# Patient Record
Sex: FEMALE | Race: WHITE | URBAN - METROPOLITAN AREA
[De-identification: names, ages, dates, MRNs, and addresses within clinical notes are randomized per-mention and may not be internally consistent; named-entity substitution may affect disease eponyms.]

---

## 2017-01-06 ENCOUNTER — HISTORICAL (OUTPATIENT)
Dept: RADIOLOGY | Facility: HOSPITAL | Age: 77
End: 2017-01-06

## 2017-03-02 ENCOUNTER — HISTORICAL (OUTPATIENT)
Dept: LAB | Facility: HOSPITAL | Age: 77
End: 2017-03-02

## 2017-03-03 ENCOUNTER — HISTORICAL (OUTPATIENT)
Dept: RADIOLOGY | Facility: HOSPITAL | Age: 77
End: 2017-03-03

## 2018-01-16 ENCOUNTER — HISTORICAL (OUTPATIENT)
Dept: LAB | Facility: HOSPITAL | Age: 78
End: 2018-01-16

## 2018-01-16 LAB — DEPRECATED CALCIDIOL+CALCIFEROL SERPL-MC: 87.34 NG/ML (ref 30–80)

## 2018-12-04 ENCOUNTER — HISTORICAL (OUTPATIENT)
Dept: ADMINISTRATIVE | Facility: HOSPITAL | Age: 78
End: 2018-12-04

## 2019-01-29 ENCOUNTER — HISTORICAL (OUTPATIENT)
Dept: ADMINISTRATIVE | Facility: HOSPITAL | Age: 79
End: 2019-01-29

## 2019-11-05 ENCOUNTER — HISTORICAL (OUTPATIENT)
Dept: LAB | Facility: HOSPITAL | Age: 79
End: 2019-11-05

## 2019-11-05 LAB
APPEARANCE, UA: CLEAR
BACTERIA SPEC CULT: NORMAL /HPF
BILIRUB UR QL STRIP: NEGATIVE
COLOR UR: YELLOW
GLUCOSE (UA): NEGATIVE
HGB UR QL STRIP: NEGATIVE
KETONES UR QL STRIP: NEGATIVE
LEUKOCYTE ESTERASE UR QL STRIP: NEGATIVE
NITRITE UR QL STRIP: NEGATIVE
PH UR STRIP: 5.5 [PH] (ref 5–9)
PROT UR QL STRIP: NEGATIVE
RBC #/AREA URNS HPF: NORMAL /[HPF]
SP GR UR STRIP: 1.01 (ref 1–1.03)
SQUAMOUS EPITHELIAL, UA: NORMAL
UROBILINOGEN UR STRIP-ACNC: 0.2
WBC #/AREA URNS HPF: NORMAL /[HPF]

## 2020-09-01 ENCOUNTER — HISTORICAL (OUTPATIENT)
Dept: ADMINISTRATIVE | Facility: HOSPITAL | Age: 80
End: 2020-09-01

## 2020-09-01 LAB
ABS NEUT (OLG): 3.49 X10(3)/MCL (ref 2.1–9.2)
ALBUMIN SERPL-MCNC: 4.3 GM/DL (ref 3.4–5)
ALBUMIN/GLOB SERPL: 2.3 RATIO (ref 1.1–2)
ALP SERPL-CCNC: 67 UNIT/L (ref 40–150)
ALT SERPL-CCNC: 18 UNIT/L (ref 0–55)
AST SERPL-CCNC: 21 UNIT/L (ref 5–34)
BASOPHILS # BLD AUTO: 0.1 X10(3)/MCL (ref 0–0.2)
BASOPHILS NFR BLD AUTO: 1 %
BILIRUB SERPL-MCNC: 0.4 MG/DL
BILIRUBIN DIRECT+TOT PNL SERPL-MCNC: 0.1 MG/DL (ref 0–0.5)
BILIRUBIN DIRECT+TOT PNL SERPL-MCNC: 0.3 MG/DL (ref 0–0.8)
BUN SERPL-MCNC: 20.5 MG/DL (ref 9.8–20.1)
CALCIUM SERPL-MCNC: 8.7 MG/DL (ref 8.4–10.2)
CHLORIDE SERPL-SCNC: 103 MMOL/L (ref 98–107)
CO2 SERPL-SCNC: 27 MMOL/L (ref 23–31)
CREAT SERPL-MCNC: 0.84 MG/DL (ref 0.55–1.02)
DEPRECATED CALCIDIOL+CALCIFEROL SERPL-MC: 115.1 NG/ML (ref 6.6–49.9)
EOSINOPHIL # BLD AUTO: 0.1 X10(3)/MCL (ref 0–0.9)
EOSINOPHIL NFR BLD AUTO: 1 %
ERYTHROCYTE [DISTWIDTH] IN BLOOD BY AUTOMATED COUNT: 13.4 % (ref 11.5–17)
GLOBULIN SER-MCNC: 1.9 GM/DL (ref 2.4–3.5)
GLUCOSE SERPL-MCNC: 89 MG/DL (ref 82–115)
HCT VFR BLD AUTO: 41.3 % (ref 37–47)
HGB BLD-MCNC: 12.9 GM/DL (ref 12–16)
LYMPHOCYTES # BLD AUTO: 0.7 X10(3)/MCL (ref 0.6–4.6)
LYMPHOCYTES NFR BLD AUTO: 15 %
MCH RBC QN AUTO: 28.2 PG (ref 27–31)
MCHC RBC AUTO-ENTMCNC: 31.2 GM/DL (ref 33–36)
MCV RBC AUTO: 90.4 FL (ref 80–94)
MONOCYTES # BLD AUTO: 0.6 X10(3)/MCL (ref 0.1–1.3)
MONOCYTES NFR BLD AUTO: 11 %
NEUTROPHILS # BLD AUTO: 3.49 X10(3)/MCL (ref 2.1–9.2)
NEUTROPHILS NFR BLD AUTO: 71 %
PLATELET # BLD AUTO: 213 X10(3)/MCL (ref 130–400)
PMV BLD AUTO: 11 FL (ref 9.4–12.4)
POTASSIUM SERPL-SCNC: 4.3 MMOL/L (ref 3.5–5.1)
PROT SERPL-MCNC: 6.2 GM/DL (ref 5.8–7.6)
RBC # BLD AUTO: 4.57 X10(6)/MCL (ref 4.2–5.4)
SODIUM SERPL-SCNC: 137 MMOL/L (ref 136–145)
WBC # SPEC AUTO: 4.9 X10(3)/MCL (ref 4.5–11.5)

## 2020-10-22 ENCOUNTER — HISTORICAL (OUTPATIENT)
Dept: SURGERY | Facility: HOSPITAL | Age: 80
End: 2020-10-22

## 2020-10-22 LAB
BUN SERPL-MCNC: 19.1 MG/DL (ref 9.8–20.1)
CALCIUM SERPL-MCNC: 9.1 MG/DL (ref 8.4–10.2)
CHLORIDE SERPL-SCNC: 106 MMOL/L (ref 98–107)
CO2 SERPL-SCNC: 26 MMOL/L (ref 23–31)
CREAT SERPL-MCNC: 0.74 MG/DL (ref 0.55–1.02)
CREAT/UREA NIT SERPL: 26
GLUCOSE SERPL-MCNC: 81 MG/DL (ref 82–115)
HCT VFR BLD AUTO: 43.1 % (ref 37–47)
HGB BLD-MCNC: 13.2 GM/DL (ref 12–16)
INR PPP: 1 (ref 0–1.3)
PLATELET # BLD AUTO: 187 X10(3)/MCL (ref 130–400)
POTASSIUM SERPL-SCNC: 4.1 MMOL/L (ref 3.5–5.1)
PROTHROMBIN TIME: 12.8 SECOND(S) (ref 11.1–13.7)
SODIUM SERPL-SCNC: 142 MMOL/L (ref 136–145)

## 2020-12-02 ENCOUNTER — HISTORICAL (OUTPATIENT)
Dept: ADMINISTRATIVE | Facility: HOSPITAL | Age: 80
End: 2020-12-02

## 2020-12-02 LAB — SARS-COV-2 RNA RESP QL NAA+PROBE: NOT DETECTED

## 2022-04-11 ENCOUNTER — HISTORICAL (OUTPATIENT)
Dept: ADMINISTRATIVE | Facility: HOSPITAL | Age: 82
End: 2022-04-11

## 2022-04-27 VITALS
SYSTOLIC BLOOD PRESSURE: 110 MMHG | HEIGHT: 64 IN | WEIGHT: 96.56 LBS | DIASTOLIC BLOOD PRESSURE: 74 MMHG | BODY MASS INDEX: 16.49 KG/M2

## 2022-04-30 NOTE — OP NOTE
Patient:   Nhi Spencer            MRN: [id number]            FIN: [id number]               Age:   78 years     Sex:  Female     :  1940   Associated Diagnoses:   None   Author:   Иван Levine MD       Phacoemulsification of Cataract with Intraocular Implant   Preoperative Diagnosis: Cataract right eye   Postoperative Diagnosis : Cataract right eye  Surgeon: Иван Levine MD  Assistant: DILIA Garcia  Anestheisa: General I.V.  Complications: None  After the patient underwent topical anesthesia along with IV sedation in the holding area, the patient was brought to the operating suite. The patient was prepped and draped in a sterile fashion. A pediatric tegaderm and a lid speculum were used to retract the upper and lower lashes and lids.  A 1.0mm paracentesis was then made at the 11 oclock position. Intraocular non-preserved 1% Xylocaine (4% diluted down to 1%) was irrigated into the anterior chamber. Trypan blue dye was used to stain the anterior capsule then Endocoat was injected into the eye. A clear corneal incision was made with a 2.4 Keratome blade. A 4.75mm circular capsulotomy was then made with a pre bent 30 gauge needle and BSS was used to hydro dissect the nucleus from the capsule. The nucleus was then phacoemulsified with the Abbott machine for a EFX of 29. The cortex was then removed with the I/A hand piece and Helon was placed into the posterior bag of the eye. An posterior chamber implant ZXR00 of power 26.0 was placed in the capsular bag. The Helon was then removed from the eye with the I/A hand piece . The anterior chamber was inflated with BSS and the wound was checked for leaks. The lid speculum was removed and a drop of Besivance was placed in the operative eye. The patient was brought to the recovery suite in stable condition.         2019 @ [facility]

## 2022-04-30 NOTE — OP NOTE
Patient:   Nhi Spencer            MRN: [id number]            FIN: [id number]               Age:   78 years     Sex:  Female     :  1940   Associated Diagnoses:   None   Author:   Иван Levine MD       Phacoemulsification of Cataract with Intraocular Implant   Preoperative Diagnosis: Cataract left eye   Postoperative Diagnosis : Cataract left eye  Surgeon: Иван Levine MD  Assistant: DILIA Garcia  Anestheisa: MAC  Complications: None    After the patient underwent topical anesthesia along with IV sedation in the holding area, the patient was brought to the operating suite. The patient prepped and draped in a sterile fashion. A pediatric tegaderm and a lid speculum were used to retract the upper and lower lashes and lids.  A 1.0mm paracentesis was then made at the 5 oclock and 11 oclock position. Intraocular non-preserved 1% Xylocaine (4% diluted down to 1%) was irrigated into the anterior chamber. Trypan blue dye was used to stain the anterior capsule then Endocoat was injected into the eye. A clear corneal incision was made with a 2.4 Keratome blade. A 4.75mm circular capsulotomy was then made with a pre bent 30 gauge needle and BSS was used to hydro dissect the nucleus from the capsule. The nucleus was then phacoemulsified with the Abbott machine for a EFX of 28. The cortex was then removed with the I/A hand piece and Helon was placed into the posterior bag of the eye. An posterior chamber implant ZXR00 of power 26.5 was placed in the capsular bag. The Helon was then removed from the eye with the I/A hand piece . The anterior chamber was inflated with BSS and the wound was checked for leaks. The lid speculum was removed and a drop of Besivance was placed in the operative eye. The patient was brought to the recovery suite in stable condition.         2018 @ \[facility]\""